# Patient Record
Sex: MALE | Race: WHITE | NOT HISPANIC OR LATINO | Employment: OTHER | URBAN - METROPOLITAN AREA
[De-identification: names, ages, dates, MRNs, and addresses within clinical notes are randomized per-mention and may not be internally consistent; named-entity substitution may affect disease eponyms.]

---

## 2020-05-15 ENCOUNTER — AMBULATORY - HEALTHEAST (OUTPATIENT)
Dept: SURGERY | Facility: CLINIC | Age: 85
End: 2020-05-15

## 2020-05-15 DIAGNOSIS — Z11.59 ENCOUNTER FOR SCREENING FOR OTHER VIRAL DISEASES: ICD-10-CM

## 2020-05-21 ASSESSMENT — MIFFLIN-ST. JEOR: SCORE: 1575.76

## 2020-05-22 ENCOUNTER — SURGERY - HEALTHEAST (OUTPATIENT)
Dept: SURGERY | Facility: HOSPITAL | Age: 85
End: 2020-05-22

## 2020-05-22 ENCOUNTER — ANESTHESIA - HEALTHEAST (OUTPATIENT)
Dept: SURGERY | Facility: HOSPITAL | Age: 85
End: 2020-05-22

## 2020-05-22 ASSESSMENT — MIFFLIN-ST. JEOR: SCORE: 1571.23

## 2021-06-02 ENCOUNTER — RECORDS - HEALTHEAST (OUTPATIENT)
Dept: ADMINISTRATIVE | Facility: CLINIC | Age: 86
End: 2021-06-02

## 2021-06-04 VITALS — WEIGHT: 187 LBS | BODY MASS INDEX: 25.33 KG/M2 | HEIGHT: 72 IN

## 2021-06-08 NOTE — ANESTHESIA PREPROCEDURE EVALUATION
Anesthesia Evaluation      Patient summary reviewed   No history of anesthetic complications     Airway   Mallampati: II  Neck ROM: full   Pulmonary - normal exam   (+) COPD, asthma                           Cardiovascular - normal exam  (+) hypertension, CAD, ,     ECG reviewed        Neuro/Psych      Endo/Other    (+) hypothyroidism,      GI/Hepatic/Renal    (+) hiatal hernia, GERD,             Dental - normal exam                        Anesthesia Plan  Planned anesthetic: general endotracheal    ASA 2   Induction: intravenous   Anesthetic plan and risks discussed with: patient  Anesthesia plan special considerations: antiemetics,   Post-op plan: routine recovery

## 2021-06-08 NOTE — ANESTHESIA POSTPROCEDURE EVALUATION
Patient: Bryce Stewart  Procedure(s):  RIGHT INGUINAL HERNIA REPAIR (Right)  Anesthesia type: general    Patient location: PACU  Last vitals:   Vitals Value Taken Time   /83 5/22/2020  2:50 PM   Temp 36.8  C (98.2  F) 5/22/2020  1:20 PM   Pulse 59 5/22/2020  2:49 PM   Resp 19 5/22/2020  2:49 PM   SpO2 100 % 5/22/2020  2:49 PM   Vitals shown include unvalidated device data.  Post vital signs: stable  Level of consciousness: awake and responds to simple questions  Post-anesthesia pain: pain controlled  Post-anesthesia nausea and vomiting: no  Pulmonary: unassisted, return to baseline  Cardiovascular: stable and blood pressure at baseline  Hydration: adequate  Anesthetic events: no    QCDR Measures:  ASA# 11 - Starla-op Cardiac Arrest: ASA11B - Patient did NOT experience unanticipated cardiac arrest  ASA# 12 - Starla-op Mortality Rate: ASA12B - Patient did NOT die  ASA# 13 - PACU Re-Intubation Rate: ASA13B - Patient did NOT require a new airway mgmt  ASA# 10 - Composite Anes Safety: ASA10A - No serious adverse event    Additional Notes:

## 2021-06-08 NOTE — ANESTHESIA CARE TRANSFER NOTE
Last vitals:   Vitals:    05/22/20 1320   BP: (!) 185/89   Pulse: 65   Resp: 25   Temp: 36.8  C (98.2  F)   SpO2: 100%     Patient's level of consciousness is drowsy  Spontaneous respirations: yes  Maintains airway independently: yes  Dentition unchanged: yes  Oropharynx: oropharynx clear of all foreign objects    QCDR Measures:  ASA# 20 - Surgical Safety Checklist: WHO surgical safety checklist completed prior to induction    PQRS# 430 - Adult PONV Prevention: 4558F - Pt received => 2 anti-emetic agents (different classes) preop & intraop  ASA# 8 - Peds PONV Prevention: NA - Not pediatric patient, not GA or 2 or more risk factors NOT present  PQRS# 424 - Starla-op Temp Management: 4559F - At least one body temp DOCUMENTED => 35.5C or 95.9F within required timeframe  PQRS# 426 - PACU Transfer Protocol: - Transfer of care checklist used  ASA# 14 - Acute Post-op Pain: ASA14B - Patient did NOT experience pain >= 7 out of 10

## 2022-08-24 ENCOUNTER — ALLIED HEALTH/NURSE VISIT (OUTPATIENT)
Dept: EDUCATION SERVICES | Facility: CLINIC | Age: 87
End: 2022-08-24
Payer: COMMERCIAL

## 2022-08-24 VITALS — BODY MASS INDEX: 27.32 KG/M2 | HEIGHT: 70 IN | WEIGHT: 190.8 LBS

## 2022-08-24 DIAGNOSIS — E11.9 TYPE 2 DIABETES, HBA1C GOAL < 7% (H): Primary | ICD-10-CM

## 2022-08-24 PROCEDURE — G0108 DIAB MANAGE TRN  PER INDIV: HCPCS | Performed by: DIETITIAN, REGISTERED

## 2022-08-24 NOTE — PROGRESS NOTES
All bran cereal     Wasa bread, egg, almonds, cheese   Water     Meat and vegetables     Had been having lg bowl ice     Cane for walking -     Diabetes Self-Management Education & Support    Presents for: New diagnosis of type 2 diabetes.        CDE VISIT MODE: In Person    Assessment Type:   ASSESSMENT:  Patient has had prediabetes dating back to 2015.  Patient states he knew it would catch up to him since prior to diagnosis he was eating large bowls of ice cream every evening along with other sweets.  Significant family history of diabetes.  Patient is motivated to control glucose to aid with healing of his skin.  Currently patient is being treated with antibiotics for lower leg cellulitis.  Patient has not been able to tolerate metformin along with the antibiotic but is willing to restart metformin when antibiotic finishes.  If patient is unable to tolerate metformin will need to evaluate alternative medication options.  Patient is currently under a very high amount of stress due to long-term partner having a stroke last evening and is currently hospitalized but doing well and will likely discharge to rehab facility.  Patient is not used to doing the cooking and grocery shopping and therefore that will be a large change for him also.    Patient's most recent   A1C 8/2/2022    9.4% = 223 estimated average glucose    Lab Results   Component Value Date    A1C 6.4 08/04/2015     is not meeting goal of <7.0    Diabetes knowledge and skills assessment:   Patient is knowledgeable in diabetes management concepts related to: Education started today    Continue education with the following diabetes management concepts: Healthy Eating, Being Active, Monitoring, Taking Medication, Problem Solving, Reducing Risks and Healthy Coping    Based on learning assessment above, most appropriate setting for further diabetes education would be: Individual setting.      PLAN    Topics to cover at upcoming visits: Monitoring, Taking  "Medication, Problem Solving, Reducing Risks and Healthy Coping    Follow-up: 1 month    See Care Plan for co-developed, patient-state behavior change goals.  AVS provided for patient today.    Education Materials Provided:  Living Healthy with Diabetes, Goals for Your Diabetes Care, Carbohydrate Counting and My Plate Planner      SUBJECTIVE/OBJECTIVE:  Diabetes education in the past 24mo: No  Diabetes type: Type 2  Disease course: Other  How confident are you filling out medical forms by yourself:: Somewhat  Cultural Influences/Ethnic Background:  Not  or       Diabetes Symptoms & Complications:  Fatigue: Yes  Neuropathy: No  Polydipsia: Sometimes  Polyphagia: No  Polyuria: Yes  Visual change: No  Slow healing wounds: Yes  Autonomic neuropathy: No  CVA: No  Heart disease: No  Nephropathy: No  Peripheral neuropathy: No  Peripheral Vascular Disease: No  Retinopathy: No  Sexual dysfunction: Other    Patient Problem List and Family Medical History reviewed for relevant medical history, current medical status, and diabetes risk factors.    Vitals:  Ht 1.778 m (5' 10\")   Wt 86.5 kg (190 lb 12.8 oz)   BMI 27.38 kg/m    Estimated body mass index is 27.38 kg/m  as calculated from the following:    Height as of this encounter: 1.778 m (5' 10\").    Weight as of this encounter: 86.5 kg (190 lb 12.8 oz).   Last 3 BP:   BP Readings from Last 3 Encounters:   No data found for BP       History   Smoking Status     Former Smoker     Packs/day: 1.00     Years: 20.00     Quit date: 1/1/1978   Smokeless Tobacco     Never Used       Labs:  Lab Results   Component Value Date    A1C 6.4 08/04/2015     No results found for: GLC  No results found for: LDL  No results found for: HDL]  No results found for: GFRESTIMATED  No results found for: GFRESTBLACK  No results found for: CR  No results found for: MICROALBUMIN    Healthy Eating:  Cultural/Alevism diet restrictions?: No  Meal planning/habits: Avoiding sweets, Carb " counting  How many times a week on average do you eat food made away from home (restaurant/take-out)?: 1  Meals include: Breakfast, Lunch, Dinner, Evening Snack  Beverages: Water, Coffee, Milk    Being Active:  Barrier to exercise: Safety, Physical limitation    Monitoring:  Patient will not start monitoring at this time BG in office 174 mg/dL.      Taking Medications:  Diabetes Medication(s)     Biguanides       metFORMIN (GLUCOPHAGE) 500 MG tablet    Take 1,000 mg by mouth 2 times daily (with meals)          Current Treatments: None    Problem Solving:         Reducing Risks:  CAD Risks: Diabetes Mellitus, Sedentary lifestyle, Stress    Healthy Coping:  Informal Support system:: Significant other   patient Activation Measure Survey Score:  No flowsheet data found.      Care Plan and Education Provided:  Care Plan: Diabetes   Updates made by Milagro Schulz RD since 8/24/2022 12:00 AM      Problem: HbA1C Not In Goal       Goal: Establish Regular Follow-Ups with PCP       Task: Discuss with PCP the recommended timing for patient's next follow up visit(s)    Responsible User: Milagro Schulz RD      Task: Discuss schedule for PCP visits with patient Completed 8/24/2022   Responsible User: Milagro Schulz RD      Goal: Get HbA1C Level in Goal       Task: Educate patient on diabetes education self-management topics Completed 8/24/2022   Responsible User: Milagro Schulz RD      Task: Educate patient on benefits of regular glucose monitoring    Responsible User: Milagro Schulz RD      Task: Refer patient to appropriate extended care team member, as needed (Medication Therapy Management, Behavioral Health, Physical Therapy, etc.)    Responsible User: Milagro Schulz RD      Task: Discuss diabetes treatment plan with patient Completed 8/24/2022   Responsible User: Milagro Schulz RD      Problem: Diabetes Self-Management Education Needed to Optimize Self-Care Behaviors       Goal: Understand diabetes pathophysiology and  disease progression       Task: Provide education on diabetes pathophysiology and disease progression specfic to patient's diabetes type Completed 8/24/2022   Responsible User: Milagro Schulz RD      Goal: Healthy Eating - follow a healthy eating pattern for diabetes    This Visit's Progress: 50%   Note:    Patient will follow a carbohydrate controlled diet staying within approximately 30 g of carbohydrate for meals at least 80% of the time     Task: Provide education on portion control and consistency in amount, composition and timing of food intake    Responsible User: Milagro Schulz RD      Task: Provide education on managing carbohydrate intake (carbohydrate counting, plate planning method, etc.) Completed 8/24/2022   Responsible User: Milagro Schulz RD      Task: Provide education on weight management Completed 8/24/2022   Responsible User: Milagro Schulz RD      Task: Provide education on heart healthy eating    Responsible User: Milagro Schulz RD      Task: Provide education on eating out    Responsible User: Milagro Schulz RD      Task: Develop individualized healthy eating plan with patient    Responsible User: Milagro Schulz RD      Goal: Being Active - get regular physical activity, working up to at least 150 minutes per week       Task: Provide education on relationship of activity to glucose and precautions to take if at risk for low glucose    Responsible User: Milagro Schulz RD      Task: Discuss barriers to physical activity with patient Completed 8/24/2022   Responsible User: Milagro Schulz RD      Task: Develop physical activity plan with patient    Responsible User: Milagro Schulz RD      Task: Explore community resources including walking groups, assistance programs, and home videos    Responsible User: Milagro Schulz RD      Goal: Monitoring - monitor glucose and ketones as directed       Task: Provide education on blood glucose monitoring (purpose, proper technique, frequency,  glucose targets, interpreting results, when to use glucose control solution, sharps disposal)    Responsible User: Milagro Schulz RD      Task: Provide education on continuous glucose monitoring (sensor placement, use of sherman or /reader, understanding glucose trends, alerts and alarms, differences between sensor glucose and blood glucose)    Responsible User: Milagro Schulz RD      Task: Provide education on ketone monitoring (when to monitor, frequency, etc.)    Responsible User: Milagro Schulz RD      Goal: Taking Medication - patient is consistently taking medications as directed       Task: Provide education on action of prescribed medication, including when to take and possible side effects Completed 8/24/2022   Responsible User: Milagro Schulz RD      Task: Provide education on insulin and injectable diabetes medications, including administration, storage, site selection and rotation for injection sites    Responsible User: Milagro Schulz RD      Task: Discuss barriers to medication adherence with patient and provide management technique ideas as appropriate    Responsible User: Milagro Schulz RD      Task: Provide education on frequency and refill details of medications Completed 8/24/2022   Responsible User: Milagro Schulz RD      Goal: Problem Solving - know how to prevent and manage short-term diabetes complications       Task: Provide education on high blood glucose - causes, signs/symptoms, prevention and treatment    Responsible User: Milagro Schulz RD      Task: Provide education on low blood glucose - causes, signs/symptoms, prevention, treatment, carrying a carbohydrate source at all times, and medical identification    Responsible User: Milagro Schulz RD      Task: Provide education on safe travel with diabetes    Responsible User: Milagro Schulz RD      Task: Provide education on how to care for diabetes on sick days    Responsible User: Milagro Schulz RD      Task: Provide  education on when to call a health care provider    Responsible User: Milagro Schulz RD      Goal: Reducing Risks - know how to prevent and treat long-term diabetes complications       Task: Provide education on major complications of diabetes, prevention, early diagnostic measures and treatment of complications    Responsible User: Milagro Schulz RD      Task: Provide education on recommended care for dental, eye and foot health    Responsible User: Milagro Schulz RD      Task: Provide education on Hemoglobin A1c - goals and relationship to blood glucose levels Completed 8/24/2022   Responsible User: Milagro Schulz RD      Task: Provide education on recommendations for heart health - lipid levels and goals, blood pressure and goals, and aspirin therapy, if indicated    Responsible User: Milagro Schulz RD      Task: Provide education on tobacco cessation    Responsible User: Milagro Schulz RD      Goal: Healthy Coping - use available resources to cope with the challenges of managing diabetes       Task: Discuss recognizing feelings about having diabetes    Responsible User: Milagro Schulz RD      Task: Provide education on the benefits of making appropriate lifestyle changes Completed 8/24/2022      Task: Provide education on benefits of utilizing support systems    Responsible User: Milagro Schulz RD      Task: Discuss methods for coping with stress    Responsible User: Milagro Schulz RD      Task: Provide education on when to seek professional counseling    Responsible User: Milagro Schulz RD            Time Spent: 30 minutes  Encounter Type: Individual    Any diabetes medication dose changes were made via the CDE Protocol per the patient's referring provider. A copy of this encounter was shared with the provider.

## 2022-08-24 NOTE — LETTER
8/24/2022         RE: Bryce Stewart  [address] [place] [address]        Dear Colleague,    Thank you for referring your patient, Bryce Stewart, to the [facility]. Please see a copy of my visit note below.    All bran cereal     Wasa bread, egg, almonds, cheese   Water     Meat and vegetables     Had been having lg bowl ice     Cane for walking -     Diabetes Self-Management Education & Support    Presents for: New diagnosis of type 2 diabetes.        CDE VISIT MODE: In Person    Assessment Type:   ASSESSMENT:  Patient has had prediabetes dating back to 2015.  Patient states he knew it would catch up to him since prior to diagnosis he was eating large bowls of ice cream every evening along with other sweets.  Significant family history of diabetes.  Patient is motivated to control glucose to aid with healing of his skin.  Currently patient is being treated with antibiotics for lower leg cellulitis.  Patient has not been able to tolerate metformin along with the antibiotic but is willing to restart metformin when antibiotic finishes.  If patient is unable to tolerate metformin will need to evaluate alternative medication options.  Patient is currently under a very high amount of stress due to long-term partner having a stroke last evening and is currently hospitalized but doing well and will likely discharge to rehab facility.  Patient is not used to doing the cooking and grocery shopping and therefore that will be a large change for him also.    Patient's most recent   A1C 8/2/2022    9.4% = 223 estimated average glucose    Lab Results   Component Value Date    A1C 6.4 08/04/2015     is not meeting goal of <7.0    Diabetes knowledge and skills assessment:   Patient is knowledgeable in diabetes management concepts related to: Education started today    Continue education with the following diabetes management concepts: Healthy Eating, Being Active, Monitoring, Taking  "Medication, Problem Solving, Reducing Risks and Healthy Coping    Based on learning assessment above, most appropriate setting for further diabetes education would be: Individual setting.      PLAN    Topics to cover at upcoming visits: Monitoring, Taking Medication, Problem Solving, Reducing Risks and Healthy Coping    Follow-up: 1 month    See Care Plan for co-developed, patient-state behavior change goals.  AVS provided for patient today.    Education Materials Provided:  Living Healthy with Diabetes, Goals for Your Diabetes Care, Carbohydrate Counting and My Plate Planner      SUBJECTIVE/OBJECTIVE:  Diabetes education in the past 24mo: No  Diabetes type: Type 2  Disease course: Other  How confident are you filling out medical forms by yourself:: Somewhat  Cultural Influences/Ethnic Background:  Not  or       Diabetes Symptoms & Complications:  Fatigue: Yes  Neuropathy: No  Polydipsia: Sometimes  Polyphagia: No  Polyuria: Yes  Visual change: No  Slow healing wounds: Yes  Autonomic neuropathy: No  CVA: No  Heart disease: No  Nephropathy: No  Peripheral neuropathy: No  Peripheral Vascular Disease: No  Retinopathy: No  Sexual dysfunction: Other    Patient Problem List and Family Medical History reviewed for relevant medical history, current medical status, and diabetes risk factors.    Vitals:  Ht 1.778 m (5' 10\")   Wt 86.5 kg (190 lb 12.8 oz)   BMI 27.38 kg/m    Estimated body mass index is 27.38 kg/m  as calculated from the following:    Height as of this encounter: 1.778 m (5' 10\").    Weight as of this encounter: 86.5 kg (190 lb 12.8 oz).   Last 3 BP:   BP Readings from Last 3 Encounters:   No data found for BP       History   Smoking Status     Former Smoker     Packs/day: 1.00     Years: 20.00     Quit date: 1/1/1978   Smokeless Tobacco     Never Used       Labs:  Lab Results   Component Value Date    A1C 6.4 08/04/2015     No results found for: GLC  No results found for: LDL  No results found " for: HDL]  No results found for: GFRESTIMATED  No results found for: GFRESTBLACK  No results found for: CR  No results found for: MICROALBUMIN    Healthy Eating:  Cultural/Gnosticism diet restrictions?: No  Meal planning/habits: Avoiding sweets, Carb counting  How many times a week on average do you eat food made away from home (restaurant/take-out)?: 1  Meals include: Breakfast, Lunch, Dinner, Evening Snack  Beverages: Water, Coffee, Milk    Being Active:  Barrier to exercise: Safety, Physical limitation    Monitoring:  Patient will not start monitoring at this time BG in office 174 mg/dL.      Taking Medications:  Diabetes Medication(s)     Biguanides       metFORMIN (GLUCOPHAGE) 500 MG tablet    Take 1,000 mg by mouth 2 times daily (with meals)          Current Treatments: None    Problem Solving:         Reducing Risks:  CAD Risks: Diabetes Mellitus, Sedentary lifestyle, Stress    Healthy Coping:  Informal Support system:: Significant other   patient Activation Measure Survey Score:  No flowsheet data found.      Care Plan and Education Provided:  Care Plan: Diabetes   Updates made by Milagro Schulz RD since 8/24/2022 12:00 AM      Problem: HbA1C Not In Goal       Goal: Establish Regular Follow-Ups with PCP       Task: Discuss with PCP the recommended timing for patient's next follow up visit(s)    Responsible User: Milagro Schulz RD      Task: Discuss schedule for PCP visits with patient Completed 8/24/2022   Responsible User: Milagro Schulz RD      Goal: Get HbA1C Level in Goal       Task: Educate patient on diabetes education self-management topics Completed 8/24/2022   Responsible User: Milagro Schulz RD      Task: Educate patient on benefits of regular glucose monitoring    Responsible User: Milagro Schulz RD      Task: Refer patient to appropriate extended care team member, as needed (Medication Therapy Management, Behavioral Health, Physical Therapy, etc.)    Responsible User: Milagro Schulz RD       Task: Discuss diabetes treatment plan with patient Completed 8/24/2022   Responsible User: Milagro Schulz RD      Problem: Diabetes Self-Management Education Needed to Optimize Self-Care Behaviors       Goal: Understand diabetes pathophysiology and disease progression       Task: Provide education on diabetes pathophysiology and disease progression specfic to patient's diabetes type Completed 8/24/2022   Responsible User: Milagro Schulz RD      Goal: Healthy Eating - follow a healthy eating pattern for diabetes    This Visit's Progress: 50%   Note:    Patient will follow a carbohydrate controlled diet staying within approximately 30 g of carbohydrate for meals at least 80% of the time     Task: Provide education on portion control and consistency in amount, composition and timing of food intake    Responsible User: Milagro Schulz RD      Task: Provide education on managing carbohydrate intake (carbohydrate counting, plate planning method, etc.) Completed 8/24/2022   Responsible User: Milagro Schulz RD      Task: Provide education on weight management Completed 8/24/2022   Responsible User: Milagro Schulz RD      Task: Provide education on heart healthy eating    Responsible User: Milagro Schulz RD      Task: Provide education on eating out    Responsible User: Milagro Schulz RD      Task: Develop individualized healthy eating plan with patient    Responsible User: Milagro Schulz RD      Goal: Being Active - get regular physical activity, working up to at least 150 minutes per week       Task: Provide education on relationship of activity to glucose and precautions to take if at risk for low glucose    Responsible User: Milagro Schulz RD      Task: Discuss barriers to physical activity with patient Completed 8/24/2022   Responsible User: Milagro Schulz RD      Task: Develop physical activity plan with patient    Responsible User: Milagro Schulz RD      Task: Explore community resources including walking  groups, assistance programs, and home videos    Responsible User: Milagro Schulz RD      Goal: Monitoring - monitor glucose and ketones as directed       Task: Provide education on blood glucose monitoring (purpose, proper technique, frequency, glucose targets, interpreting results, when to use glucose control solution, sharps disposal)    Responsible User: Milagro Schulz RD      Task: Provide education on continuous glucose monitoring (sensor placement, use of sherman or /reader, understanding glucose trends, alerts and alarms, differences between sensor glucose and blood glucose)    Responsible User: Milagro Schulz RD      Task: Provide education on ketone monitoring (when to monitor, frequency, etc.)    Responsible User: Milagro Schulz RD      Goal: Taking Medication - patient is consistently taking medications as directed       Task: Provide education on action of prescribed medication, including when to take and possible side effects Completed 8/24/2022   Responsible User: Milagro Schulz RD      Task: Provide education on insulin and injectable diabetes medications, including administration, storage, site selection and rotation for injection sites    Responsible User: Milagro Schulz RD      Task: Discuss barriers to medication adherence with patient and provide management technique ideas as appropriate    Responsible User: Milagro Schulz RD      Task: Provide education on frequency and refill details of medications Completed 8/24/2022   Responsible User: Milagro Schulz RD      Goal: Problem Solving - know how to prevent and manage short-term diabetes complications       Task: Provide education on high blood glucose - causes, signs/symptoms, prevention and treatment    Responsible User: Milagro Schulz RD      Task: Provide education on low blood glucose - causes, signs/symptoms, prevention, treatment, carrying a carbohydrate source at all times, and medical identification    Responsible User:  Milagro Schulz RD      Task: Provide education on safe travel with diabetes    Responsible User: Milagro Schulz RD      Task: Provide education on how to care for diabetes on sick days    Responsible User: Milagro Schulz RD      Task: Provide education on when to call a health care provider    Responsible User: Milagro Schulz RD      Goal: Reducing Risks - know how to prevent and treat long-term diabetes complications       Task: Provide education on major complications of diabetes, prevention, early diagnostic measures and treatment of complications    Responsible User: Milagro Schulz RD      Task: Provide education on recommended care for dental, eye and foot health    Responsible User: Milagro Schulz RD      Task: Provide education on Hemoglobin A1c - goals and relationship to blood glucose levels Completed 8/24/2022   Responsible User: Milagro Schulz RD      Task: Provide education on recommendations for heart health - lipid levels and goals, blood pressure and goals, and aspirin therapy, if indicated    Responsible User: Milagro Schulz RD      Task: Provide education on tobacco cessation    Responsible User: Milagro Schulz RD      Goal: Healthy Coping - use available resources to cope with the challenges of managing diabetes       Task: Discuss recognizing feelings about having diabetes    Responsible User: Milagro Schulz RD      Task: Provide education on the benefits of making appropriate lifestyle changes Completed 8/24/2022      Task: Provide education on benefits of utilizing support systems    Responsible User: Milagro Schulz RD      Task: Discuss methods for coping with stress    Responsible User: Milagro Schulz RD      Task: Provide education on when to seek professional counseling    Responsible User: Milagro Schulz RD            Time Spent: 30 minutes  Encounter Type: Individual    Any diabetes medication dose changes were made via the CDE Protocol per the patient's referring provider. A  copy of this encounter was shared with the provider.